# Patient Record
Sex: FEMALE | Race: WHITE | NOT HISPANIC OR LATINO | URBAN - METROPOLITAN AREA
[De-identification: names, ages, dates, MRNs, and addresses within clinical notes are randomized per-mention and may not be internally consistent; named-entity substitution may affect disease eponyms.]

---

## 2017-11-07 ENCOUNTER — IMPORTED ENCOUNTER (OUTPATIENT)
Dept: URBAN - METROPOLITAN AREA CLINIC 50 | Facility: CLINIC | Age: 52
End: 2017-11-07

## 2017-11-08 ENCOUNTER — IMPORTED ENCOUNTER (OUTPATIENT)
Dept: URBAN - METROPOLITAN AREA CLINIC 50 | Facility: CLINIC | Age: 52
End: 2017-11-08

## 2017-11-14 ENCOUNTER — IMPORTED ENCOUNTER (OUTPATIENT)
Dept: URBAN - METROPOLITAN AREA CLINIC 50 | Facility: CLINIC | Age: 52
End: 2017-11-14

## 2019-01-24 ENCOUNTER — IMPORTED ENCOUNTER (OUTPATIENT)
Dept: URBAN - METROPOLITAN AREA CLINIC 50 | Facility: CLINIC | Age: 54
End: 2019-01-24

## 2020-03-30 ENCOUNTER — IMPORTED ENCOUNTER (OUTPATIENT)
Dept: URBAN - METROPOLITAN AREA CLINIC 50 | Facility: CLINIC | Age: 55
End: 2020-03-30

## 2021-04-18 ASSESSMENT — TONOMETRY
OS_IOP_MMHG: 18
OD_IOP_MMHG: 17
OD_IOP_MMHG: 18
OS_IOP_MMHG: 16
OS_IOP_MMHG: 16
OD_IOP_MMHG: 16

## 2021-04-18 ASSESSMENT — VISUAL ACUITY
OD_CC: J1+
OD_OTHER: 20/60. 20/80.
OS_CC: J1
OD_OTHER: 20/30. 20/40.
OS_OTHER: 20/80. 20/400.
OS_BAT: 20/50
OS_PH: 20/30
OS_CC: J1+
OS_OTHER: 20/50. 20/60.
OD_BAT: 20/30
OS_OTHER: 20/40. 20/50.
OS_CC: 20/40
OS_CC: J1+
OD_CC: 20/30
OD_CC: 20/40
OD_CC: 20/30
OS_CC: 20/40
OD_PH: 20/30
OD_CC: J1+
OD_BAT: 20/40
OD_BAT: 20/60
OS_CC: 20/50-
OS_BAT: 20/80
OD_OTHER: 20/40. 20/50.
OS_BAT: 20/40
OD_CC: J1

## 2021-09-02 ENCOUNTER — PROBLEM (OUTPATIENT)
Dept: URBAN - METROPOLITAN AREA CLINIC 48 | Facility: CLINIC | Age: 56
End: 2021-09-02

## 2021-09-02 DIAGNOSIS — E11.9: ICD-10-CM

## 2021-09-02 DIAGNOSIS — H52.13: ICD-10-CM

## 2021-09-02 DIAGNOSIS — H40.013: ICD-10-CM

## 2021-09-02 DIAGNOSIS — H43.813: ICD-10-CM

## 2021-09-02 DIAGNOSIS — H25.11: ICD-10-CM

## 2021-09-02 PROCEDURE — 92014 COMPRE OPH EXAM EST PT 1/>: CPT

## 2021-09-02 PROCEDURE — 92015 DETERMINE REFRACTIVE STATE: CPT

## 2021-09-02 PROCEDURE — 92133 CPTRZD OPH DX IMG PST SGM ON: CPT

## 2021-09-02 PROCEDURE — 92134 CPTRZ OPH DX IMG PST SGM RTA: CPT

## 2021-09-02 ASSESSMENT — TONOMETRY
OD_IOP_MMHG: 19
OS_IOP_MMHG: 19

## 2021-09-02 ASSESSMENT — VISUAL ACUITY
OD_SC: 20/400
OD_CC: 20/40
OU_CC: J3
OS_SC: 20/400
OS_CC: 20/60

## 2021-09-02 NOTE — PATIENT DISCUSSION
Patient advised that IOP is wnl today and so was [facility] Exophthalmometry. Recommended f/u with PCP if she continues to feel pressure sensation under eyes.

## 2021-09-02 NOTE — PATIENT DISCUSSION
Per mod c/d ratio, OS>OD. IOP wnl, 19/19. Denies family h/o Glaucoma. OCT(RNFL) done today shows borderline thinning inferiorly OS, wnl OD. Schedule HVF/OCT(RNFL)/PACH OU next available.

## 2021-09-14 ENCOUNTER — DIAGNOSTICS - HVF/OCT (OUTPATIENT)
Dept: URBAN - METROPOLITAN AREA CLINIC 48 | Facility: CLINIC | Age: 56
End: 2021-09-14

## 2021-09-14 DIAGNOSIS — H40.013: ICD-10-CM

## 2021-09-14 PROCEDURE — 92133 CPTRZD OPH DX IMG PST SGM ON: CPT

## 2021-09-14 PROCEDURE — 92083 EXTENDED VISUAL FIELD XM: CPT

## 2021-09-21 ENCOUNTER — BIOMETRY (OUTPATIENT)
Dept: URBAN - METROPOLITAN AREA CLINIC 49 | Facility: CLINIC | Age: 56
End: 2021-09-21

## 2021-09-21 DIAGNOSIS — H25.11: ICD-10-CM

## 2021-09-21 DIAGNOSIS — E11.9: ICD-10-CM

## 2021-09-21 DIAGNOSIS — H25.812: ICD-10-CM

## 2021-09-21 PROCEDURE — 92136 OPHTHALMIC BIOMETRY: CPT

## 2021-09-21 PROCEDURE — TOPOIOL CORNEAL TOPOGRAPHY-PREMIUM IOL

## 2021-09-21 ASSESSMENT — KERATOMETRY
OD_K1POWER_DIOPTERS: 45.25
OD_AXISANGLE2_DEGREES: 7
OS_AXISANGLE_DEGREES: 084
OS_AXISANGLE2_DEGREES: 174
OD_AXISANGLE_DEGREES: 097
OS_K1POWER_DIOPTERS: 46.50
OD_K2POWER_DIOPTERS: 42.12
OS_K2POWER_DIOPTERS: 43.12

## 2021-10-01 ENCOUNTER — PRE OP - CE/IOL OS (OUTPATIENT)
Dept: URBAN - METROPOLITAN AREA CLINIC 48 | Facility: CLINIC | Age: 56
End: 2021-10-01

## 2021-10-01 DIAGNOSIS — H25.812: ICD-10-CM

## 2021-10-01 PROCEDURE — PREOP PRE OP VISIT

## 2021-10-01 ASSESSMENT — VISUAL ACUITY
OD_CC: 20/40
OS_CC: 20/60

## 2021-10-01 ASSESSMENT — KERATOMETRY
OS_AXISANGLE2_DEGREES: 174
OS_K2POWER_DIOPTERS: 43.12
OS_K1POWER_DIOPTERS: 46.50
OD_K1POWER_DIOPTERS: 45.25
OD_AXISANGLE2_DEGREES: 7
OS_AXISANGLE_DEGREES: 084
OD_AXISANGLE_DEGREES: 097
OD_K2POWER_DIOPTERS: 42.12

## 2021-10-01 ASSESSMENT — TONOMETRY
OD_IOP_MMHG: 18
OS_IOP_MMHG: 18

## 2021-10-01 NOTE — PATIENT DISCUSSION
Plan on leaving OS nearsighted secondary to not correcting high order of astigmatism and OD not scheduled creating possible off balance.

## 2021-10-01 NOTE — PATIENT DISCUSSION
CATARACT SURGERY PLANNER - STANDARD IOL/NO FEMTO: Phacoemulsification with IOL: Eye: OS|DOS: 10/07/2021|Model: AABOO|Power: 20. 0|Target: -3.00|Visc: NUVISC|Omidria: YES|10% Phenylephrine: YES|Epi-shugarcaine: YES|Phaco Setting: STD|Olive Tip: +/|Pupilloplasty: +/-|Notes: PLAN: AABOO @ -3.00 OS; OD NOT SCHEDULED. HX. LR GLAUCOMA SUSPECT OU-NO DROPS; RARE GUTTATA OU; PVD OU. DILATED PUPIL:6MM. ***NIDDM***.

## 2021-10-07 ENCOUNTER — SURGERY/PROCEDURE (OUTPATIENT)
Dept: URBAN - METROPOLITAN AREA SURGERY 16 | Facility: SURGERY | Age: 56
End: 2021-10-07

## 2021-10-07 ENCOUNTER — SAME DAY PO - CE/IOL (OUTPATIENT)
Dept: URBAN - METROPOLITAN AREA CLINIC 48 | Facility: CLINIC | Age: 56
End: 2021-10-07

## 2021-10-07 DIAGNOSIS — Z98.42: ICD-10-CM

## 2021-10-07 DIAGNOSIS — H25.812: ICD-10-CM

## 2021-10-07 PROCEDURE — AAB00 SENSAR MONOFOCAL IOL – NO FEMTO

## 2021-10-07 PROCEDURE — 66984 XCAPSL CTRC RMVL W/O ECP: CPT

## 2021-10-07 PROCEDURE — 99024 POSTOP FOLLOW-UP VISIT: CPT

## 2021-10-07 ASSESSMENT — KERATOMETRY
OS_K2POWER_DIOPTERS: 43.12
OD_K2POWER_DIOPTERS: 42.12
OS_AXISANGLE2_DEGREES: 174
OD_K1POWER_DIOPTERS: 45.25
OS_K1POWER_DIOPTERS: 46.50
OD_AXISANGLE_DEGREES: 097
OS_K1POWER_DIOPTERS: 46.50
OD_K2POWER_DIOPTERS: 42.12
OS_AXISANGLE2_DEGREES: 174
OS_AXISANGLE_DEGREES: 084
OD_AXISANGLE2_DEGREES: 7
OD_AXISANGLE_DEGREES: 097
OD_AXISANGLE2_DEGREES: 7
OD_K1POWER_DIOPTERS: 45.25
OS_AXISANGLE_DEGREES: 084
OS_K2POWER_DIOPTERS: 43.12

## 2021-10-07 ASSESSMENT — TONOMETRY: OS_IOP_MMHG: 4

## 2021-10-07 ASSESSMENT — VISUAL ACUITY: OS_SC: 20/400

## 2021-10-15 ENCOUNTER — 1 WEEK PO - CE/IOL (OUTPATIENT)
Dept: URBAN - METROPOLITAN AREA CLINIC 49 | Facility: CLINIC | Age: 56
End: 2021-10-15

## 2021-10-15 DIAGNOSIS — Z98.42: ICD-10-CM

## 2021-10-15 PROCEDURE — 99024 POSTOP FOLLOW-UP VISIT: CPT

## 2021-10-15 ASSESSMENT — TONOMETRY
OS_IOP_MMHG: 13
OD_IOP_MMHG: 15

## 2021-10-15 ASSESSMENT — KERATOMETRY
OS_K2POWER_DIOPTERS: 43.12
OD_AXISANGLE_DEGREES: 097
OS_K1POWER_DIOPTERS: 46.50
OD_K2POWER_DIOPTERS: 42.12
OD_AXISANGLE2_DEGREES: 7
OD_K1POWER_DIOPTERS: 45.25
OS_AXISANGLE2_DEGREES: 174
OS_AXISANGLE_DEGREES: 084

## 2021-10-15 ASSESSMENT — VISUAL ACUITY
OS_SC: 20/150
OS_PH: 20/30
OS_SC: J1
OD_SC: 20/50
OU_SC: 20/50

## 2021-11-11 ENCOUNTER — 4 WEEK PO - CE/IOL (OUTPATIENT)
Dept: URBAN - METROPOLITAN AREA CLINIC 48 | Facility: CLINIC | Age: 56
End: 2021-11-11

## 2021-11-11 DIAGNOSIS — D48.7: ICD-10-CM

## 2021-11-11 DIAGNOSIS — Z96.1: ICD-10-CM

## 2021-11-11 DIAGNOSIS — Z98.42: ICD-10-CM

## 2021-11-11 PROCEDURE — 92134 CPTRZ OPH DX IMG PST SGM RTA: CPT

## 2021-11-11 PROCEDURE — 99024 POSTOP FOLLOW-UP VISIT: CPT

## 2021-11-11 ASSESSMENT — KERATOMETRY
OS_AXISANGLE_DEGREES: 084
OD_AXISANGLE2_DEGREES: 7
OD_K1POWER_DIOPTERS: 45.25
OS_K1POWER_DIOPTERS: 46.50
OS_AXISANGLE2_DEGREES: 174
OS_K2POWER_DIOPTERS: 43.12
OD_AXISANGLE_DEGREES: 097
OD_K2POWER_DIOPTERS: 42.12

## 2021-11-11 ASSESSMENT — VISUAL ACUITY
OS_SC: 20/400
OD_GLARE: 20/40
OD_CC: 20/25
OU_CC: J1
OD_GLARE: 20/40

## 2021-11-11 ASSESSMENT — TONOMETRY
OD_IOP_MMHG: 18
OS_IOP_MMHG: 16

## 2021-11-11 NOTE — PATIENT DISCUSSION
PCO ([id number]): Visually significant PCO present on exam today. Recommend YAG laser capsulotomy to improve vision and decrease glare symptoms. RBAs of procedure discussed. Patient agrees and wishes to proceed.

## 2021-11-16 ENCOUNTER — YAG CAPSULOTOMY OS (OUTPATIENT)
Dept: URBAN - METROPOLITAN AREA CLINIC 49 | Facility: CLINIC | Age: 56
End: 2021-11-16

## 2021-11-16 DIAGNOSIS — H26.492: ICD-10-CM

## 2021-11-16 PROCEDURE — 66821 AFTER CATARACT LASER SURGERY: CPT

## 2021-11-16 RX ORDER — PREDNISOLONE ACETATE 10 MG/ML: 1 SUSPENSION/ DROPS OPHTHALMIC

## 2021-11-16 ASSESSMENT — VISUAL ACUITY
OS_CC: 20/400
OD_CC: 20/25
OS_PH: 20/60

## 2021-11-16 ASSESSMENT — KERATOMETRY
OD_K2POWER_DIOPTERS: 42.12
OS_AXISANGLE2_DEGREES: 174
OD_AXISANGLE_DEGREES: 097
OS_K2POWER_DIOPTERS: 43.12
OD_AXISANGLE2_DEGREES: 7
OS_AXISANGLE_DEGREES: 084
OS_K1POWER_DIOPTERS: 46.50
OD_K1POWER_DIOPTERS: 45.25

## 2021-11-16 ASSESSMENT — TONOMETRY
OS_IOP_MMHG: 17
OD_IOP_MMHG: 16

## 2021-11-16 NOTE — PROCEDURE NOTE: CLINICAL
PROCEDURE NOTE: YAG Capsulotomy OS. Diagnosis: Posterior Capsular Opacification (PCO). Prep: Mydriacil 1% and Phenylephrine 2.5%. Prior to treatment, the risks/benefits/alternatives were discussed. The patient wished to proceed with procedure. Consent was signed. Proparacaine and brominidine were placed into the operative eye after the eye was dilated. Power = 1.5mJ. Number of pulses = 41. Patient tolerated procedure well and there were no complications. Post Laser instructions given. Yesica Cabrera

## 2021-11-16 NOTE — PATIENT DISCUSSION
Yag Cap Left Eye performed today with signed consent. Start Prednisolone Acetate BID x 1 week, then QD x 1 week, then discontinue.

## 2021-11-23 ENCOUNTER — DIAGNOSTICS - HVF (OUTPATIENT)
Dept: URBAN - METROPOLITAN AREA CLINIC 48 | Facility: CLINIC | Age: 56
End: 2021-11-23

## 2021-11-23 DIAGNOSIS — H40.013: ICD-10-CM

## 2021-11-23 PROCEDURE — 92083 EXTENDED VISUAL FIELD XM: CPT

## 2021-11-23 PROCEDURE — 76514 ECHO EXAM OF EYE THICKNESS: CPT

## 2021-11-23 ASSESSMENT — KERATOMETRY
OS_AXISANGLE2_DEGREES: 174
OS_K2POWER_DIOPTERS: 43.12
OD_K2POWER_DIOPTERS: 42.12
OD_AXISANGLE2_DEGREES: 7
OS_K1POWER_DIOPTERS: 46.50
OS_AXISANGLE_DEGREES: 084
OD_K1POWER_DIOPTERS: 45.25
OD_AXISANGLE_DEGREES: 097

## 2021-11-23 NOTE — PATIENT DISCUSSION
Repeat HVF OD (11/23/2021) shows shallow sup defects; improved from last. Repeat HVF OS (11/23/2021) ess full; improved from last.

## 2021-12-16 ENCOUNTER — FOLLOW UP (OUTPATIENT)
Dept: URBAN - METROPOLITAN AREA CLINIC 48 | Facility: CLINIC | Age: 56
End: 2021-12-16

## 2021-12-16 DIAGNOSIS — Z98.890: ICD-10-CM

## 2021-12-16 PROCEDURE — 99024 POSTOP FOLLOW-UP VISIT: CPT

## 2021-12-16 ASSESSMENT — VISUAL ACUITY
OU_SC: J3
OS_SC: 20/100-1
OU_SC: 20/100-1
OD_SC: J5
OD_SC: 20/400
OS_SC: J5

## 2021-12-16 ASSESSMENT — TONOMETRY
OD_IOP_MMHG: 18
OS_IOP_MMHG: 18
OD_IOP_MMHG: 18
OS_IOP_MMHG: 19

## 2021-12-16 ASSESSMENT — KERATOMETRY
OS_AXISANGLE2_DEGREES: 174
OS_K1POWER_DIOPTERS: 46.50
OD_K2POWER_DIOPTERS: 42.12
OD_AXISANGLE2_DEGREES: 7
OD_AXISANGLE_DEGREES: 097
OS_AXISANGLE_DEGREES: 084
OS_K2POWER_DIOPTERS: 43.12
OD_K1POWER_DIOPTERS: 45.25

## 2022-05-28 ENCOUNTER — EMERGENCY VISIT (OUTPATIENT)
Dept: URBAN - METROPOLITAN AREA CLINIC 48 | Facility: CLINIC | Age: 57
End: 2022-05-28

## 2022-05-28 DIAGNOSIS — H20.00: ICD-10-CM

## 2022-05-28 DIAGNOSIS — H16.223: ICD-10-CM

## 2022-05-28 PROCEDURE — 92012 INTRM OPH EXAM EST PATIENT: CPT

## 2022-05-28 RX ORDER — PREDNISOLONE ACETATE 10 MG/ML
1 SUSPENSION/ DROPS OPHTHALMIC
Start: 2022-05-28

## 2022-05-28 ASSESSMENT — KERATOMETRY
OD_AXISANGLE_DEGREES: 097
OS_K1POWER_DIOPTERS: 46.50
OS_AXISANGLE_DEGREES: 084
OD_AXISANGLE2_DEGREES: 7
OS_AXISANGLE2_DEGREES: 174
OD_K1POWER_DIOPTERS: 45.25
OS_K2POWER_DIOPTERS: 43.12
OD_K2POWER_DIOPTERS: 42.12

## 2022-05-28 ASSESSMENT — TONOMETRY
OS_IOP_MMHG: 17
OD_IOP_MMHG: 19

## 2022-05-28 ASSESSMENT — VISUAL ACUITY
OS_CC: 20/25+2
OD_CC: 20/20

## 2022-05-28 NOTE — PATIENT DISCUSSION
Patient was seen today by Dr. Kelsey Blanco after hours in [facility] location.

## 2022-05-28 NOTE — PATIENT DISCUSSION
Patient came to clinic with daughter. Patient states she is not feeling well today and has history of nausea (nausea is not new today). Patient has been in and out of hospital for multiple different health concerns. Poor historian. After checking [facility] today, patient felt very nauseous and began dry heaving for ~5-10min. Patient's daughter states she is unable to vomit because of a surgery in the past. Patient felt very sick during the exam. IOP was normal and angles were open. Patient also states her blood pressure has been variable recently. Patient felt better after she began using her oxygen and resting for a few moments. Spoke with daughter and due to extreme nausea and BP issues, recommend patient go to hospital for further evaluation. Daughter agrees and states she is taking patient to the hospital today for evaluation due to extensive health history.

## 2022-05-28 NOTE — PATIENT DISCUSSION
Symptomatic x 1 day. 1+ cell evident today. No signs of abrasion/FB/SEI/dendrite. IOP 17 OS. Patient declined dilation today due to not feeling well. Recommend start Pred Acetate TID OS until follow up (will taper). Advised to RTC sooner if worsening of symptoms. Recommend follow up in [address] Terrell Wright. Office is not open at this time. Will call patient on Tuesday morning to schedule appointment.

## 2022-06-02 ENCOUNTER — FOLLOW UP (OUTPATIENT)
Dept: URBAN - METROPOLITAN AREA CLINIC 49 | Facility: CLINIC | Age: 57
End: 2022-06-02

## 2022-06-02 DIAGNOSIS — H16.223: ICD-10-CM

## 2022-06-02 DIAGNOSIS — H20.00: ICD-10-CM

## 2022-06-02 PROCEDURE — 92012 INTRM OPH EXAM EST PATIENT: CPT

## 2022-06-02 ASSESSMENT — VISUAL ACUITY
OS_CC: 20/25-1
OD_CC: 20/20-1

## 2022-06-02 ASSESSMENT — KERATOMETRY
OD_K2POWER_DIOPTERS: 42.12
OD_K1POWER_DIOPTERS: 45.25
OS_K2POWER_DIOPTERS: 43.12
OS_K1POWER_DIOPTERS: 46.50
OS_AXISANGLE_DEGREES: 084
OS_AXISANGLE2_DEGREES: 174
OD_AXISANGLE_DEGREES: 097
OD_AXISANGLE2_DEGREES: 7

## 2022-06-02 ASSESSMENT — TONOMETRY
OD_IOP_MMHG: 19
OD_IOP_MMHG: 19
OS_IOP_MMHG: 17
OS_IOP_MMHG: 18

## 2022-06-02 NOTE — PATIENT DISCUSSION
Improving. Will start taper. Continue Pred Acetate TID until saturday then BID for 2 weeks then QDAY for 2 weeks. RTC in 4 weeks for follow up.

## 2022-07-20 ENCOUNTER — FOLLOW UP (OUTPATIENT)
Dept: URBAN - METROPOLITAN AREA CLINIC 48 | Facility: CLINIC | Age: 57
End: 2022-07-20

## 2022-07-20 DIAGNOSIS — H20.00: ICD-10-CM

## 2022-07-20 PROCEDURE — 92012 INTRM OPH EXAM EST PATIENT: CPT

## 2022-07-20 ASSESSMENT — KERATOMETRY
OD_K1POWER_DIOPTERS: 45.25
OS_AXISANGLE_DEGREES: 084
OS_AXISANGLE2_DEGREES: 174
OS_K2POWER_DIOPTERS: 43.12
OD_K2POWER_DIOPTERS: 42.12
OS_K1POWER_DIOPTERS: 46.50
OD_AXISANGLE_DEGREES: 097
OD_AXISANGLE2_DEGREES: 7

## 2022-07-20 ASSESSMENT — TONOMETRY
OS_IOP_MMHG: 18
OD_IOP_MMHG: 18
OD_IOP_MMHG: 18
OS_IOP_MMHG: 17

## 2022-07-20 ASSESSMENT — VISUAL ACUITY
OS_CC: 20/25-1
OD_CC: 20/25

## 2022-11-17 ENCOUNTER — COMPREHENSIVE EXAM (OUTPATIENT)
Dept: URBAN - METROPOLITAN AREA CLINIC 48 | Facility: LOCATION | Age: 57
End: 2022-11-17

## 2022-11-17 DIAGNOSIS — H43.812: ICD-10-CM

## 2022-11-17 DIAGNOSIS — H04.123: ICD-10-CM

## 2022-11-17 DIAGNOSIS — H25.11: ICD-10-CM

## 2022-11-17 DIAGNOSIS — B07.9: ICD-10-CM

## 2022-11-17 DIAGNOSIS — H40.013: ICD-10-CM

## 2022-11-17 DIAGNOSIS — E11.9: ICD-10-CM

## 2022-11-17 PROCEDURE — 92014 COMPRE OPH EXAM EST PT 1/>: CPT

## 2022-11-17 ASSESSMENT — VISUAL ACUITY
OD_GLARE: 20/80
OU_CC: J2 @ 14"
OD_GLARE: 20/200
OS_CC: 20/30+2
OD_CC: 20/25-2

## 2022-11-17 ASSESSMENT — KERATOMETRY
OS_K1POWER_DIOPTERS: 46.50
OD_K1POWER_DIOPTERS: 45.25
OS_AXISANGLE_DEGREES: 084
OD_AXISANGLE_DEGREES: 097
OD_AXISANGLE2_DEGREES: 7
OS_K2POWER_DIOPTERS: 43.12
OD_K2POWER_DIOPTERS: 42.12
OS_AXISANGLE2_DEGREES: 174

## 2022-11-17 ASSESSMENT — TONOMETRY
OS_IOP_MMHG: 12
OD_IOP_MMHG: 15

## 2022-11-17 NOTE — PATIENT DISCUSSION
Patient inquired about possibly having this removed but is unsure if she wants to proceed. Advised patient we will put the orders in for a consult with Dr. Dixie Bellamy and patient can decide from there with what she wants to do.

## 2022-12-22 ENCOUNTER — DIAGNOSTICS ONLY (OUTPATIENT)
Dept: URBAN - METROPOLITAN AREA CLINIC 48 | Facility: LOCATION | Age: 57
End: 2022-12-22

## 2022-12-22 PROCEDURE — 92133 CPTRZD OPH DX IMG PST SGM ON: CPT

## 2022-12-22 PROCEDURE — 92083 EXTENDED VISUAL FIELD XM: CPT

## 2022-12-22 ASSESSMENT — KERATOMETRY
OD_AXISANGLE_DEGREES: 097
OS_AXISANGLE_DEGREES: 084
OS_K1POWER_DIOPTERS: 46.50
OS_K2POWER_DIOPTERS: 43.12
OD_AXISANGLE2_DEGREES: 7
OD_K2POWER_DIOPTERS: 42.12
OS_AXISANGLE2_DEGREES: 174
OD_K1POWER_DIOPTERS: 45.25

## 2022-12-22 NOTE — PATIENT DISCUSSION
Patient inquired about possibly having this removed but is unsure if she wants to proceed. Advised patient we will put the orders in for a consult with Dr. Burton Campos and patient can decide from there with what she wants to do.

## 2023-05-23 NOTE — PATIENT DISCUSSION
Advised not to get new glasses if she decides to proceed with cataract surgery.
DM NORMAL: No signs of diabetic retinopathy or diabetic macular edema on exam. Continue blood sugar and blood pressure control; consider exercise program as directed by PCP. We will re-evaluate in one year or sooner as needed. Letter to PCP.
Discussed lid hygiene, warm compress and eyelid wash.
Low Risk Glaucoma Suspect OU - Followed by Dr. Benedicto Camp.
NOT VISUALLY SIGNIFICANT: Informed patient that their cataract is not visually significant or does not meet the criteria for cataract surgery. Recommend attention to cataract symptoms monitoring by regular examinations. Patient instructed to call with changes in vision.
OCT RNFL (09/14/2021) shows borderline inf quad OD and abnormal inf quad OS.
OCT RNFL/HVF (09/14/2021) abnormal OD/OS.
Patient given Rx for glasses.
Patient is doing well after recent procedure.
Patient scheduled 11/23/2021 for repeat HVF OU.
Per mod CDs OU.
Per mod CDs OU. IOP wnl. (-)Fam hx.
RTC 6 months repeat OCT RNFL/HVF.
RTC sooner if worsening of symptoms.
Recommend patient continue use of artificial tears for lubrication.
Recommended artificial tears to use as directed.
Recommended observation.
Recommended patient to use PF artificial tears BID-TID OU/as needed.
Repeat HVF OD (11/23/2021) shows shallow sup defects; improved from last. Repeat HVF OS (11/23/2021) ess full; improved from last.
Resolved x last visit. Patient finished tapering Prednisolone as prescribed.
Retinal tear and detachment warning symptoms reviewed and patient instructed to call immediately if increasing floaters, flashes, or decreasing peripheral vision.
Stable. Monitor.
Will monitor.
Yag Cap Left Eye performed today with signed consent. Start Prednisolone Acetate BID x 1 week, then QD x 1 week, then discontinue.
all other ROS negative except as per HPI

## 2023-11-22 ENCOUNTER — COMPREHENSIVE EXAM (OUTPATIENT)
Dept: URBAN - METROPOLITAN AREA CLINIC 48 | Facility: LOCATION | Age: 58
End: 2023-11-22

## 2023-11-22 DIAGNOSIS — H25.11: ICD-10-CM

## 2023-11-22 DIAGNOSIS — H35.412: ICD-10-CM

## 2023-11-22 DIAGNOSIS — H04.123: ICD-10-CM

## 2023-11-22 DIAGNOSIS — H40.013: ICD-10-CM

## 2023-11-22 DIAGNOSIS — E11.9: ICD-10-CM

## 2023-11-22 DIAGNOSIS — H52.13: ICD-10-CM

## 2023-11-22 DIAGNOSIS — H43.813: ICD-10-CM

## 2023-11-22 DIAGNOSIS — H43.822: ICD-10-CM

## 2023-11-22 PROCEDURE — 92015 DETERMINE REFRACTIVE STATE: CPT

## 2023-11-22 PROCEDURE — 92014 COMPRE OPH EXAM EST PT 1/>: CPT

## 2023-11-22 PROCEDURE — 92134 CPTRZ OPH DX IMG PST SGM RTA: CPT

## 2023-11-22 ASSESSMENT — KERATOMETRY
OS_K1POWER_DIOPTERS: 45.25
OD_AXISANGLE_DEGREES: 180
OS_AXISANGLE_DEGREES: 165
OD_K2POWER_DIOPTERS: 45.50
OS_K2POWER_DIOPTERS: 48.00
OS_AXISANGLE2_DEGREES: 75
OD_AXISANGLE2_DEGREES: 90
OD_K1POWER_DIOPTERS: 42.25

## 2023-11-22 ASSESSMENT — TONOMETRY
OS_IOP_MMHG: 14
OD_IOP_MMHG: 14

## 2023-11-22 ASSESSMENT — VISUAL ACUITY
OS_CC: 20/25
OD_CC: 20/25
OU_CC: J1
OD_GLARE: 20/30
OD_GLARE: 20/40
